# Patient Record
Sex: MALE | Race: OTHER
[De-identification: names, ages, dates, MRNs, and addresses within clinical notes are randomized per-mention and may not be internally consistent; named-entity substitution may affect disease eponyms.]

---

## 2020-10-25 ENCOUNTER — HOSPITAL ENCOUNTER (EMERGENCY)
Dept: HOSPITAL 72 - EMR | Age: 30
Discharge: HOME | End: 2020-10-25
Payer: SELF-PAY

## 2020-10-25 VITALS — DIASTOLIC BLOOD PRESSURE: 80 MMHG | SYSTOLIC BLOOD PRESSURE: 120 MMHG

## 2020-10-25 VITALS — DIASTOLIC BLOOD PRESSURE: 74 MMHG | SYSTOLIC BLOOD PRESSURE: 118 MMHG

## 2020-10-25 VITALS — WEIGHT: 232 LBS | BODY MASS INDEX: 31.42 KG/M2 | HEIGHT: 72 IN

## 2020-10-25 DIAGNOSIS — L30.9: Primary | ICD-10-CM

## 2020-10-25 PROCEDURE — 99283 EMERGENCY DEPT VISIT LOW MDM: CPT

## 2020-10-25 PROCEDURE — 96372 THER/PROPH/DIAG INJ SC/IM: CPT

## 2020-10-25 NOTE — EMERGENCY ROOM REPORT
History of Present Illness


General


Chief Complaint:  Allergic Reaction


Source:  Patient





Present Illness


HPI


31 YO male presents to the ED c/o 10/10 in severity burning and itchy rash to 

the bilateral forearms x 45 mins. Pt. reports acute onset after cutting down an 

agave plant. Pt. reports he Took 25mg Benadryl PO PTA.  Pt. denies fevers, 

chills or swollen tender lymph nodes. Denies lesions/rashes elsewhere on the 

body. Denies new medications or body washes or creams. Denies swelling of the 

lips, tongue , throat or airway. Denies wheezing, or shortness of breath.  

Denies recent travel, recent illness or ill contacts.  denies blisters, oral 

lesions, or sloughing of the skin.


Allergies:  


Coded Allergies:  


     No Known Allergies (Unverified , 10/25/20)





COVID-19 Screening


Contact w/high risk pt:  No


Experienced COVID-19 symptoms?:  No


COVID-19 Testing performed PTA:  No





Patient History


Past Medical History:  see triage record


Past Surgical History:  none


Pertinent Family History:  none


Reviewed Nursing Documentation:  PMH: Agreed; PSxH: Agreed





Nursing Documentation-PMH


Past Medical History:  No Stated History





Review of Systems


All Other Systems:  negative except mentioned in HPI





Physical Exam





Vital Signs








  Date Time  Temp Pulse Resp B/P (MAP) Pulse Ox O2 Delivery O2 Flow Rate FiO2


 


10/25/20 14:16 98.4 86 19 118/74 (89) 95 Room Air  








Sp02 EP Interpretation:  reviewed, normal


General Appearance:  no apparent distress, alert, GCS 15, non-toxic


Head:  normocephalic, atraumatic


Eyes:  bilateral eye normal inspection, bilateral eye PERRL


ENT:  hearing grossly normal, normal voice, other - no swelling of the lips or 

tongue


Neck:  full range of motion, other - no stridor


Respiratory:  chest non-tender, lungs clear, normal breath sounds, no wheezing, 

speaking full sentences


Cardiovascular #1:  regular rate, rhythm, no edema


Musculoskeletal:  back normal, normal range of motion, gait/station normal, non-

tender


Neurologic:  alert, motor strength/tone normal, oriented x3, sensory intact, 

responsive, speech normal


Psychiatric:  judgement/insight normal


Skin:  rash - erythema and diffuse swelling to the lateral forearms.  There are 

no blisters or vesicles.  The rash has not spread elsewhere such as the neck or 

face.


Lymphatic:  no adenopathy





Medical Decision Making


PA Attestation


Dr. Ambrosio Is my supervising Physician whom patient management has been d

iscussed with.


Diagnostic Impression:  


   Primary Impression:  


   Dermatitis


ER Course


27 YO female presents to the ED c/o 9/10 in severity painful rash to the left 

posterior buttock and thigh that has been progressive x 3 days. Pt. reports 

originally started as a painful blister in the left buttock area.  Patient 

thought that this was a spider bite.  Patient states she applied hydrocortisone 

cream.  Patient reports upon awakening the next morning the rash was much more 

extensive and had spread.  Pt. denies fevers, chills or swollen tender lymph 

nodes. Denies lesions/rashes elsewhere on the body. Denies new medications or 

body washes or creams. She denies ocular involvement. Denies facial lesions. 

Denies swelling of the lips, tongue , throat or airway. Denies wheezing, or 

shortness of breath.  Denies recent travel, recent illness or ill contacts.  

denies blisters, oral lesions, or sloughing of the skin. She is not sure if she 

had the chicken pox or not in her childhood. 





Ddx considered but are not limited to cellulitis, allergic reaction, angio 

edema, abscess 





Vital signs: are WNL, pt. is afebrile 





H&PE are most consistent with contact dermatitis to the bilateral forearms.  

There is no evidence of anaphylaxis or acute impending airway compromise.  

Patient is in no acute distress and nontoxic in appearance.





ORDERS: none required at this time, the diagnosis is clinical 





ED INTERVENTIONS: 





- copious irrigation to the bilateral forearms performed by ED tech. 


- Ice Packs applied to affected areas by RN


-IM Solu-Medrol 60mg, IM Benadryl 25mg 





Patient is reevaluated after interventions and it is noted that his symptoms 

have significantly improved.  There is little erythema left on his bilateral 

forearms.  Patient continues to maintain his own airway without evidence of 

impending airway compromise.





DISCHARGE: At this time pt. is stable for d/c to home. Will provide printed 

patient care instructions, and any necessary prescriptions. Care plan and follow

up instructions have been discussed with the patient prior to discharge.





Last Vital Signs








  Date Time  Temp Pulse Resp B/P (MAP) Pulse Ox O2 Delivery O2 Flow Rate FiO2


 


10/25/20 14:16 98.4 86 19 118/74 (89) 95 Room Air  








Disposition:  HOME, SELF-CARE


Condition:  Stable


Scripts


Hydrocortisone/Aloe (Hydrocortisone/Aloe 1% Cream*) Y Cr


1 APPLIC TOPIC Q6H PRN for Itching, #30 GM


   Prov: Florencia Christian         10/25/20 


Prednisone* (PREDNISONE*) 20 Mg Tablet


20 MG ORAL DAILY for 4 Days, #4 TAB 0 Refills


   Prov: Florencia Christian         10/25/20 


Diphenhydramine Hcl (BENADRYL ALLERGY) 25 Mg Tablet


25 MG PO Q6HR, #30 TAB


   Prov: Florencia Christian         10/25/20


Patient Instructions:  Contact Dermatitis, Easy-to-Read





Additional Instructions:  


Take medications as directed. Do not drink alcohol, drive, or operate heavy 

machinery while taking Benadryl as this may cause drowsiness. 








 ** Follow up with a Primary Care Provider in 3-5 days, even if your symptoms 

have resolved. ** 


--Please review list of primary care clinics, if you do not already have a 

primary care provider





Return sooner to ED if new symptoms occur, or current symptoms become worse. 











- Please note that this Emergency Department Report was dictated using [employer] technology software, occasionally this can lead to 

erroneous entry secondary to interpretation by the dictation equipment.











Florencia Christian              Oct 25, 2020 14:36